# Patient Record
Sex: FEMALE | Race: WHITE | URBAN - METROPOLITAN AREA
[De-identification: names, ages, dates, MRNs, and addresses within clinical notes are randomized per-mention and may not be internally consistent; named-entity substitution may affect disease eponyms.]

---

## 2017-12-26 ENCOUNTER — OFFICE VISIT (OUTPATIENT)
Dept: ORTHOPEDIC SURGERY | Age: 42
End: 2017-12-26

## 2017-12-26 VITALS — WEIGHT: 120 LBS | BODY MASS INDEX: 22.66 KG/M2 | HEIGHT: 61 IN

## 2017-12-26 DIAGNOSIS — M70.51 PES ANSERINUS BURSITIS OF RIGHT KNEE: ICD-10-CM

## 2017-12-26 DIAGNOSIS — M25.561 RIGHT KNEE PAIN, UNSPECIFIED CHRONICITY: Primary | ICD-10-CM

## 2017-12-26 PROCEDURE — 73564 X-RAY EXAM KNEE 4 OR MORE: CPT | Performed by: PHYSICIAN ASSISTANT

## 2017-12-26 PROCEDURE — 99203 OFFICE O/P NEW LOW 30 MIN: CPT | Performed by: PHYSICIAN ASSISTANT

## 2017-12-26 RX ORDER — DEXTROAMPHETAMINE SACCHARATE, AMPHETAMINE ASPARTATE, DEXTROAMPHETAMINE SULFATE AND AMPHETAMINE SULFATE 5; 5; 5; 5 MG/1; MG/1; MG/1; MG/1
TABLET ORAL
Refills: 0 | COMMUNITY
Start: 2017-12-18 | End: 2018-05-24 | Stop reason: CLARIF

## 2017-12-27 NOTE — PROGRESS NOTES
every 6 hours as needed for Pain for 6 doses. 6 tablet 0    diclofenac (VOLTAREN) 75 MG EC tablet Take 1 tablet by mouth 2 times daily for 10 days. 20 tablet 0     No current facility-administered medications for this visit. Medical History:   Past Medical History:   Diagnosis Date    Thyroid disease      Allergies: No Known Allergies  Problem List:  There is no problem list on file for this patient. Review of Systems:  All systems were reviewed on 12/26/2017 and were negative except as indicated on the ROS form attached to this encounter (or located in the Media tab). Vital Signs:  Ht 5' 1\" (1.549 m)   Wt 120 lb (54.4 kg)   BMI 22.67 kg/m²     General Exam:  Constitutional: Patient is adequately groomed with no evidence of malnutrition  Mental Status: The patient is oriented to time, place and person. The patient's mood and affect are appropriate. Neurological: The patient has good coordination. There is no weakness or sensory deficit. Right knee Examination:   Inspection: Today's inspection of the right knee reveals the skin to be intact with no obvious swelling or ecchymosis    Palpation: He is tender over the anterior medial aspect of the right knee at the tibial plateau    Range of motion: -5° of extension to only 90° of flexion before she has increased pain    Strength: -5/5 in her quadriceps and hamstrings on the right    Special tests: There is no gross ligament instability noted with varus and valgus stress testing    Skin: There are no rashes, ulcerations or lesions    Gait: With a slight limp with ambulation      Radiology:  X-rays obtained and reviewed in office:  Views: AP, lateral, sunrise view, tunnel view right knee  Location(s): Right knee  Impression: There are no acute or subacute fractures or subluxations noted within the right knee    Assessment:  Right knee Pes bursitis    Impression:   Encounter Diagnoses   Name Primary?     Right knee pain, unspecified chronicity Yes  Pes anserinus bursitis of right knee        Office Procedures:  Orders Placed This Encounter   Procedures    XR KNEE RIGHT (MIN 4 VIEWS)     [id number]     Order Specific Question:   Reason for exam:     Answer:   Pain       Treatment Plan:  Patient's knee was wrapped with a 6 inch Ace wrap that she is to ice directly over. She is to continue with ibuprofen and she will follow-up with Dr. Deana Meeks in 1-2 weeks. Arina Dia PA-C    * Please note that some or all of this record was generated using voice recognition software. If there are any questions about the content of this document, please contact me as some errors in transcription may have occurred.

## 2017-12-27 NOTE — PATIENT INSTRUCTIONS
Patient's knee was wrapped with a 6 inch Ace wrap that she is to ice directly over. She is to continue with ibuprofen and she will follow-up with Dr. Karin Caldwell in 1-2 weeks.